# Patient Record
Sex: FEMALE | Race: WHITE | NOT HISPANIC OR LATINO | Employment: STUDENT | URBAN - METROPOLITAN AREA
[De-identification: names, ages, dates, MRNs, and addresses within clinical notes are randomized per-mention and may not be internally consistent; named-entity substitution may affect disease eponyms.]

---
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
## 2023-07-18 NOTE — PATIENT INSTRUCTIONS
When Your Child Has Constipation    Constipation is a common problem in children. Your child has constipation if he or she has stools that are hard and dry, which often leads to straining or difficulty passing stool.  What causes constipation?  Constipation can be caused by:  · Too little fiber in the diet  · Too little liquid in the diet  · Not enough exercise  · Painful past bowel movements. This can lead to your child holding his or her stool.  · Stress and anxiety issues. These can include changes in routine or problems at home or school.  · Certain medicines  · Physical problems. These can include abnormalities of the colon or rectum.  · Recent illness or surgery. This could be from dehydration and medicines.  What are common symptoms of constipation?  · Feeling the urge to pass stool, but not being able to  · Cramping  · Bloating and gas  · Decreased appetite  · Stool leakage  · Nausea  How is constipation diagnosed?  The healthcare provider examines your child. Youll be asked about your childs symptoms, diet, health, and daily routine. The healthcare provider may also order some tests or X-rays to rule out other problems.  How is constipation treated?  The healthcare provider can talk to you about treatment options. Your child may need to:  · Eat more fiber and drink more liquids. Fiber is found in most whole grains, fruits, and vegetables. It adds bulk and absorbs water to soften stool. This helps stool pass through the colon more easily. Drinking water and moderate amounts of certain fruit juices, such as prune or apple juice, can also help soften stool.  · Get more exercise. Exercise can help the colon work better and ease constipation.  · Take stool softeners. The healthcare provider may suggest stool softeners for your child. Your child should take them until bowel movements become more regular and the diet is adjusted. Discuss with your child's healthcare provider exactly which medicines to give  you child and for how long.  · Do bowel retraining. The healthcare provider may tell you to have your child sit on the toilet for 5 to 10 minutes at a time, several times a day. The best time to do this is after a meal. This helps the child relearn the feeling of needing to have a bowel movement.  Call the healthcare provider if your child  · Is vomiting repeatedly or has green or bloody vomit  · Remains constipated for more than 2 weeks  · Has blood mixed in the stool or has very dark or tarry stools  · Repeatedly soils his or her underpants  · Cries or complains about belly pain not relieved with the passage of gas   Date Last Reviewed: 10/1/2016  © 3369-0712 [employer]. [address] [place] [address], [place] [address]. All rights reserved. This information is not intended as a substitute for professional medical care. Always follow your healthcare professional's instructions.        Constipation (Child)    Bowel movement patterns vary in children. A child around age 2 will have about 2 bowel movements per day. After 4 years of age, a child may have 1 bowel movement per day.  A normal stool is soft and easy to pass. But sometimes stools become firm or hard. They are difficult to pass. They may pass less often. This is called constipation. It is common in children. Each child's bowel habits are a little different. What seems like constipation in one child may be normal in another. Symptoms of constipation can include:  · Abdominal pain  · Refusal to eat  · Bloating  · Vomiting  · Streaks of blood in stools  · Problems holding in urine or stool  · Stool in your child's underwear  · Painful bowel movements  · Itching, swelling, bleeding, or pain around the anus  Constipation can have many causes, such as:  · Eating a diet low in fiber  · Eating too many dairy foods or processed foods  · Not drinking enough liquids  · Lack of exercise or physical activity  · Stress or changes in routine  · Frequent use or  misuse of laxatives  · Ignoring the urge to have a bowel movement or delaying bowel movements  · Medicines such as prescription pain medicine, iron, antacids, certain antidepressants, and calcium supplements  · Less commonly, bowel blockage and bowel inflammation  Simple constipation is easy to stop once the cause is known. Healthcare providers may or may not do any tests to diagnose constipation.  Home care  Your childs healthcare provider may prescribe a bowel stimulant, lubricant, or suppository. Your child may also need an enema or a laxative. Follow all instructions on how and when to use these products.  Food, drink, and habit changes  You can help treat and prevent your childs constipation with some simple changes in diet and habits.  Make changes in your childs diet, such as:  · Replace cow's milk with a nondairy milk or formula made from soy or rice.  · Increase fiber in your childs diet. You can do this by adding fruits, vegetables, cereals, and grains.  · Make sure your child eats less meat and processed foods.  · Make sure your child drinks more water. Certain fruit juices such as pear, prune, and apple, can be helpful. However, fruit juices are full of sugar so limit fruit juice to 2 to 4 ounces a day in children 4 to 8 months old, and 6 ounces in children 8 to 12 months old.  · Be patient and make diet changes over time. Most children can be fussy about food.  Help your child have good toilet habits. Make sure to:  · Teach your child not wait to have a bowel movement.  · Have your child sit on the toilet for 10 minutes at the same time each day. It is helpful to have your child sit after each meal. This helps to create a routine.  · Give your child a comfortable childs toilet seat and a footstool.  · You can read or keep your child company to make it a positive experience.  Follow-up care  Follow up with your childs healthcare provider.  Special note to parents  Learn to be familiar with your  childs normal bowel pattern. Note the color, form, and frequency of stools.  Call 911  Call 911 if your child has any of these symptoms:  · Firm belly that is very painful to the touch  · Trouble breathing  · Confusion  · Loss of consciousness  · Rapid heart rate  When to seek medical advice  Call your childs healthcare provider right away if any of these occur:  · Abdominal pain that gets worse  · Fussiness or crying that cant be soothed  · Refusal to drink or eat  · Blood in stool  · Black, tarry stool  · Constipation that does not get better  · Weight loss  · Your child is younger than 12 weeks and has a fever of 100.4°F (38°C)  or higher because your baby may need to be seen by his or her healthcare provider  · Your child is younger than 2 years old and his or her fever continues for more than 24 hours or your child 2 years or older has a fever for more than 3 days.  · A child 2 years or older has a fever for more than 3 days  · A child of any age has repeated fevers above 104°F (40°C)   Date Last Reviewed: 12/12/2015  © 8940-3556 [employer] [address] [place] [address]. All rights reserved. This information is not intended as a substitute for professional medical care. Always follow your healthcare professional's instructions.        Constipation (Adult)  Constipation means that you have bowel movements that are less frequent than usual. Stools often become very hard and difficult to pass.  Constipation is very common. At some point in life it affects almost everyone. Since everyone's bowel habits are different, what is constipation to one person may not be to another. Your healthcare provider may do tests to diagnose constipation. It depends on what he or she finds when evaluating you.    Symptoms of constipation include:  · Abdominal pain  · Bloating  · Vomiting  · Painful bowel movements  · Itching, swelling, bleeding, or pain around the anus  Causes  Constipation can have  many causes. These include:  · Diet low in fiber  · Too much dairy  · Not drinking enough liquids  · Lack of exercise or physical activity. This is especially true for older adults.  · Changes in lifestyle or daily routine, including pregnancy, aging, work, and travel  · Frequent use or misuse of laxatives  · Ignoring the urge to have a bowel movement or delaying it until later  · Medicines, such as certain prescription pain medicines, iron supplements, antacids, certain antidepressants, and calcium supplements  · Diseases like irritable bowel syndrome, bowel obstructions, stroke, diabetes, thyroid disease, Parkinson disease, hemorrhoids, and colon cancer  Complications  Potential complications of constipation can include:  · Hemorrhoids  · Rectal bleeding from hemorrhoids or anal fissures (skin tears)  · Hernias  · Dependency on laxatives  · Chronic constipation  · Fecal impaction  · Bowel obstruction or perforation  Home care  All treatment should be done after talking with your healthcare provider. This is especially true if you have another medical problems, are taking prescription medicines, or are an older adult. Treatment most often involves lifestyle changes. You may also need medicines. Your healthcare provider will tell you which will work best for you. Follow the advice below to help avoid this problem in the future.  Lifestyle changes  These lifestyle changes can help prevent constipation:  · Diet. Eat a high-fiber diet, with fresh fruit and vegetables, and reduce dairy intake, meats, and processed foods  · Fluids. It's important to get enough fluids each day. Drink plenty of water when you eat more fiber. If you are on diet that limits the amount of fluid you can have, talk about this with your healthcare provider.  · Regular exercise. Check with your healthcare provider first.  Medications  Take any medicines as directed. Some laxatives are safe to use only every now and then. Others can be taken on a  regular basis. Talk with your doctor or pharmacist if you have questions.  Prescription pain medicines can cause constipation. If you are taking this kind of medicine, ask your healthcare provider if you should also take a stool softener.  Medicines you may take to treat constipation include:  · Fiber supplements  · Stool softeners  · Laxatives  · Enemas  · Rectal suppositories  Follow-up care  Follow up with your healthcare provider if symptoms don't get better in the next few days. You may need to have more tests or see a specialist.  Call 911  Call 911 if any of these occur:  · Trouble breathing  · Stiff, rigid abdomen that is severely painful to touch  · Confusion  · Fainting or loss of consciousness  · Rapid heart rate  · Chest pain  When to seek medical advice  Call your healthcare provider right away if any of these occur:  · Fever over 100.4°F (38°C)  · Failure to resume normal bowel movements  · Pain in your abdomen or back gets worse  · Nausea or vomiting  · Swelling in your abdomen  · Blood in the stool  · Black, tarry stool  · Involuntary weight loss  · Weakness  Date Last Reviewed: 12/30/2015  © 1725-5409 [employer]. [address] [place] [address]. All rights reserved. This information is not intended as a substitute for professional medical care. Always follow your healthcare professional's instructions.         Anxiety COPD without exacerbation COPD exacerbation